# Patient Record
Sex: MALE | Race: OTHER | HISPANIC OR LATINO | URBAN - METROPOLITAN AREA
[De-identification: names, ages, dates, MRNs, and addresses within clinical notes are randomized per-mention and may not be internally consistent; named-entity substitution may affect disease eponyms.]

---

## 2017-10-13 PROBLEM — Z00.00 ENCOUNTER FOR PREVENTIVE HEALTH EXAMINATION: Status: ACTIVE | Noted: 2017-10-13

## 2017-10-18 ENCOUNTER — OUTPATIENT (OUTPATIENT)
Dept: OUTPATIENT SERVICES | Facility: HOSPITAL | Age: 27
LOS: 1 days | End: 2017-10-18
Payer: MEDICAID

## 2017-10-18 VITALS — HEIGHT: 69 IN | WEIGHT: 121.03 LBS

## 2017-10-18 DIAGNOSIS — K05.6 PERIODONTAL DISEASE, UNSPECIFIED: ICD-10-CM

## 2017-10-18 DIAGNOSIS — K02.62 DENTAL CARIES ON SMOOTH SURFACE PENETRATING INTO DENTIN: ICD-10-CM

## 2017-10-18 DIAGNOSIS — K02.52 DENTAL CARIES ON PIT AND FISSURE SURFACE PENETRATING INTO DENTIN: ICD-10-CM

## 2017-10-18 DIAGNOSIS — K08.499 PARTIAL LOSS OF TEETH DUE TO OTHER SPECIFIED CAUSE, UNSPECIFIED CLASS: Chronic | ICD-10-CM

## 2017-10-18 DIAGNOSIS — Z01.818 ENCOUNTER FOR OTHER PREPROCEDURAL EXAMINATION: ICD-10-CM

## 2017-10-18 PROCEDURE — G0463: CPT

## 2017-10-18 NOTE — H&P PST ADULT - NSANTHOSAYNRD_GEN_A_CORE
No. SHAGUFTA screening performed.  STOP BANG Legend: 0-2 = LOW Risk; 3-4 = INTERMEDIATE Risk; 5-8 = HIGH Risk

## 2017-10-18 NOTE — H&P PST ADULT - HISTORY OF PRESENT ILLNESS
27yr old male with autism unwilling to cooperate today.  coming in for in for comprehensive dental work

## 2017-10-25 ENCOUNTER — OUTPATIENT (OUTPATIENT)
Dept: OUTPATIENT SERVICES | Facility: HOSPITAL | Age: 27
LOS: 1 days | End: 2017-10-25
Payer: MEDICAID

## 2017-10-25 VITALS
DIASTOLIC BLOOD PRESSURE: 80 MMHG | HEART RATE: 97 BPM | RESPIRATION RATE: 20 BRPM | TEMPERATURE: 98 F | SYSTOLIC BLOOD PRESSURE: 122 MMHG | OXYGEN SATURATION: 99 %

## 2017-10-25 VITALS — WEIGHT: 121.03 LBS | HEIGHT: 69 IN

## 2017-10-25 DIAGNOSIS — K05.6 PERIODONTAL DISEASE, UNSPECIFIED: ICD-10-CM

## 2017-10-25 DIAGNOSIS — K08.499 PARTIAL LOSS OF TEETH DUE TO OTHER SPECIFIED CAUSE, UNSPECIFIED CLASS: Chronic | ICD-10-CM

## 2017-10-25 DIAGNOSIS — K02.62 DENTAL CARIES ON SMOOTH SURFACE PENETRATING INTO DENTIN: ICD-10-CM

## 2017-10-25 PROCEDURE — D4341: CPT

## 2017-10-25 PROCEDURE — D4210: CPT

## 2017-10-25 RX ORDER — OXYCODONE HYDROCHLORIDE 5 MG/1
5 TABLET ORAL ONCE
Qty: 0 | Refills: 0 | Status: DISCONTINUED | OUTPATIENT
Start: 2017-10-25 | End: 2017-10-25

## 2017-10-25 NOTE — BRIEF OPERATIVE NOTE - PROCEDURE
<<-----Click on this checkbox to enter Procedure Dental exam, with x-ray imaging, dental cleaning, and restoration  10/25/2017    Active  SARAH

## 2017-10-25 NOTE — BRIEF OPERATIVE NOTE - PRE-OP DX
Dental caries extending into dentin  10/25/2017    Active  Valerie Cintron  Periodontal disease  10/25/2017    Active  Valerie Cintron

## 2017-10-26 ENCOUNTER — TRANSCRIPTION ENCOUNTER (OUTPATIENT)
Age: 27
End: 2017-10-26

## 2022-11-02 NOTE — ASU PATIENT PROFILE, ADULT - ALCOHOL USE HISTORY SINGLE SELECT
never Arava Counseling:  Patient counseled regarding adverse effects of Arava including but not limited to nausea, vomiting, abnormalities in liver function tests. Patients may develop mouth sores, rash, diarrhea, and abnormalities in blood counts. The patient understands that monitoring is required including LFTs and blood counts.  There is a rare possibility of scarring of the liver and lung problems that can occur when taking methotrexate. Persistent nausea, loss of appetite, pale stools, dark urine, cough, and shortness of breath should be reported immediately. Patient advised to discontinue Arava treatment and consult with a physician prior to attempting conception. The patient will have to undergo a treatment to eliminate Arava from the body prior to conception.

## 2023-11-13 ENCOUNTER — EMERGENCY (EMERGENCY)
Facility: HOSPITAL | Age: 33
LOS: 1 days | Discharge: HOME | End: 2023-11-13
Attending: EMERGENCY MEDICINE | Admitting: EMERGENCY MEDICINE
Payer: MEDICAID

## 2023-11-13 DIAGNOSIS — K08.499 PARTIAL LOSS OF TEETH DUE TO OTHER SPECIFIED CAUSE, UNSPECIFIED CLASS: Chronic | ICD-10-CM

## 2023-11-13 PROCEDURE — 99283 EMERGENCY DEPT VISIT LOW MDM: CPT

## 2023-11-13 NOTE — ED PROVIDER NOTE - CLINICAL SUMMARY MEDICAL DECISION MAKING FREE TEXT BOX
Nelda: Autism. Cerebral palsy. Fall (as often occurs to him). No e/o significant intracranial injury. No indication for CT or MRI. Dc home w/ return precautions.

## 2023-11-13 NOTE — ED PROVIDER NOTE - PHYSICAL EXAMINATION
Well-appearing, well nourished, awake, alert, oriented to person, place, time/situation and in no apparent distress.    Airway patent. Neck supple.    Eyes without scleral injection. No jaundice.    Strong pulse.    Respirations unlabored.    Abdomen soft, non-tender, no guarding.    MSK: Spine appears normal, range of motion is not limited, no muscle or joint tenderness.    Alert and oriented, no gross motor or sensory deficits.    Skin: normal color for race, warm, dry and intact. No evidence of rash.    No SI/HI. Well-appearing, well nourished, awake, alert, unable to assess orientation 2/2 autism, agitated 2/2 strange situation for him, o/w in no apparent distress.    Airway patent. Neck supple.    Eyes without scleral injection. No jaundice.    Strong pulse.    Respirations unlabored.    Abdomen soft, non-tender, no guarding.    MSK: Spine appears normal, range of motion is not limited, no muscle or joint tenderness.    Alert, no gross motor or sensory deficits. Falling down or lowering himself to the ground occasionally, as per usual.    Skin: normal color for race, warm, dry and intact. No evidence of rash.    No SI/HI.

## 2023-11-13 NOTE — ED PROVIDER NOTE - PATIENT PORTAL LINK FT
You can access the FollowMyHealth Patient Portal offered by [facility] by registering at the following website: [email or web address]. By joining [employer]’s FollowMyHealth portal, you will also be able to view your health information using other applications (apps) compatible with our system.

## 2023-11-13 NOTE — ED ADULT TRIAGE NOTE - CHIEF COMPLAINT QUOTE
Pt brought in by mom c/o fall and hit his head on wooden door today. As per family, Pt occasionally loses balance. No blood thinner use. Pt deaf. Unable to obtain VS in triage, Pt not cooperative. PHx cerebral palsy

## 2023-11-13 NOTE — ED PROVIDER NOTE - OBJECTIVE STATEMENT
Nelda: Cerebral palsy. Falls sometimes. Today, fell at doctor's office. Hit head. No LOC. No lac. Was sent home. Was behaving himself (eating, playing puzzle, getting ready for bedtime). Family got a call from doctor's office to go to ER to see if he needs a CT. No vomiting. No focal weakness. Nelda: Autism. Cerebral palsy. Falls sometimes. Today, fell at doctor's office. Hit head. No LOC. No lac. Was sent home. Was behaving himself (eating, playing puzzle, getting ready for bedtime). Family got a call from doctor's office to go to ER to see if he needs a CT. No vomiting. No focal weakness. Pt agitated when he arrived here, as this is a strange place for him.

## 2023-11-13 NOTE — ED PROVIDER NOTE - NSFOLLOWUPINSTRUCTIONS_ED_ALL_ED_FT
Return if have seizure or vomiting or severe headache.     May be tired than usual owing to possible mild concussion.

## 2023-11-14 PROBLEM — J30.2 OTHER SEASONAL ALLERGIC RHINITIS: Chronic | Status: ACTIVE | Noted: 2017-10-18

## 2023-11-14 PROBLEM — F84.0 AUTISTIC DISORDER: Chronic | Status: ACTIVE | Noted: 2017-10-18

## 2025-02-03 NOTE — ASU DISCHARGE PLAN (ADULT/PEDIATRIC). - NS AS DC DISCHARGE ACCOMPANY
Spinal Block    Date/Time: 2/2/2025 10:43 PM    Performed by: Polly Hsu DO  Authorized by: Polly Hsu DO      General Information and Staff    Start Time:  2/2/2025 10:43 PM  End Time:  2/2/2025 10:47 PM  Anesthesiologist:  Polly Hsu DO  Performed by:  Anesthesiologist  Patient Location:  OR  Site identification: surface landmarks    Reason for Block: surgical anesthesia    Preanesthetic Checklist: patient identified, IV checked, risks and benefits discussed, monitors and equipment checked, pre-op evaluation, timeout performed, anesthesia consent and sterile technique used      Procedure Details    Patient Position:  Sitting  Prep: ChloraPrep and patient draped    Monitoring:  Cardiac monitor and continuous pulse ox  Approach:  Midline  Location:  L3-4  Injection Technique:  Single-shot    Needle    Needle Type:  Pencil-tip  Needle Gauge:  24 G  Needle Length:  4 in    Assessment    Sensory Level:   Events: clear CSF, CSF aspirated, well tolerated and blood negative      Additional Comments       Family

## 2025-06-02 ENCOUNTER — EMERGENCY (EMERGENCY)
Facility: HOSPITAL | Age: 35
LOS: 1 days | End: 2025-06-02
Attending: STUDENT IN AN ORGANIZED HEALTH CARE EDUCATION/TRAINING PROGRAM | Admitting: STUDENT IN AN ORGANIZED HEALTH CARE EDUCATION/TRAINING PROGRAM
Payer: MEDICAID

## 2025-06-02 DIAGNOSIS — K08.499 PARTIAL LOSS OF TEETH DUE TO OTHER SPECIFIED CAUSE, UNSPECIFIED CLASS: Chronic | ICD-10-CM

## 2025-06-02 PROCEDURE — 99284 EMERGENCY DEPT VISIT MOD MDM: CPT

## 2025-06-02 PROCEDURE — 73562 X-RAY EXAM OF KNEE 3: CPT | Mod: 26,LT

## 2025-06-02 NOTE — ED PROVIDER NOTE - NSFOLLOWUPINSTRUCTIONS_ED_ALL_ED_FT
Please follow up with your doctor within 1 week. Bring copies of your results with you (provided in your discharge paperwork).     You may take 500-1000 mg acetaminophen (tylenol) every 6 hours, as needed for pain.  You may take 600 mg ibuprofen every 8 hours, with food, as needed for pain.  You can take tylenol and ibuprofen at the same time.     PLEASE RETURN TO ED FOR NEW OR WORSENING SYMPTOMS (intractable nausea/vomiting, severe bleeding, fever over 100.4F, loss of movement of one or more limbs, seizure, knee redness, inability to walk increased warmth over knee)

## 2025-06-02 NOTE — ED PROVIDER NOTE - ATTENDING CONTRIBUTION TO CARE
I have personally performed a face to face medical and diagnostic evaluation of the patient. I have discussed with and reviewed the Resident's note and agree with the History, ROS, Physical Exam and MDM unless otherwise indicated. A brief summary of my personal evaluation and impression can be found below.    Behzad EVANS:  34-year-old male, history of severe MR emi yates, presents with a chief complaint of concern for left knee injury, patient is unable to provide comprehensive history, per mom and father patient has had episodes of putting himself on the floor with multiple repeated injuries to his knee, they believe he injured it about a week ago at his day group care, noticed swelling to the knee was concerned prompting his visit to the ER, he has been able to walk, no fever is acting at baseline otherwise normally, no reported other injuries or complaints, the episodes of putting himself on the floor are not new per parents.     All other ROS negative, except as above and as per HPI and ROS section.    VITALS: Initial triage and subsequent vitals have been reviewed by me.  GEN APPEARANCE: Alert, non-toxic, well-appearing, NAD.  HEAD: Atraumatic.  EYES: PERRLa, EOMI, vision grossly intact.   NECK: Supple  MSK/EXT: Left knee with diffuse swelling mild warmth able to range fully actively walking in the ER intermittently put himself on the floor  NEURO: Alert,. Sensation and motor normal x4 extremities.   SKIN: Warm, dry and intact. No rash.  PSYCH: Severe MR emi nonverbal    Plan/MDM: Exam unable to obtain vital signs due to severe MR patient is noncompliant intermittently putting himself on the floor, left knee with significant swelling however presentation not appear consistent that of a septic joint as he is able to walk in the ER suspect more likely traumatic effusion/hemarthrosis, no other signs of other injury, it is parents desire to get an xr to eval for fx and to not sedate patient  I explained I have a low suspicion for septic knee at this time though risk versus benefit versus safety of patient in the ED we attempted to expedite imaging for  prompt ray evaluation, patient was walked over to x-ray with multiple providers to facilitate a safe acquisition of images, patient with intermittent episodes of dropping himself on the floor not redirectable, x-ray did not show any signs of obvious fracture, mother and father comfortable taking patient home as he is acting at his normal self otherwise, strict return precautions were discussed with emphasis o on precautions for septic joint, recommended mother attempt to try to use hockey shin pads for further protection.  Mother was comfortable taking home.

## 2025-06-02 NOTE — ED ADULT TRIAGE NOTE - CHIEF COMPLAINT QUOTE
No
Pt ambulatory to triage c/o L knee swelling x 1 week. Brought in by parents. Pt has severe MR, autism. Pt deaf. Unable to obtain VS in triage.

## 2025-06-02 NOTE — ED PROVIDER NOTE - PHYSICAL EXAMINATION
GENERAL: NAD  HEART: Regular rate and rhythm, no murmurs, rubs, or gallops  LUNGS: Unlabored respirations.  Clear to auscultation bilaterally, no crackles, wheezing, or rhonchi  EXTREMITIES: 2+ peripheral pulses bilaterally. No clubbing, cyanosis. edema left knee no erythema not warm to touch  NERVOUS SYSTEM:  A&Ox3, moving all extremities, no focal deficits   SKIN: No rashes or lesions

## 2025-06-02 NOTE — ED PROVIDER NOTE - NSFOLLOWUPCLINICS_GEN_ALL_ED_FT
[facility]  Orthopedic Surgery  [address], [address]rd & [address] [place] [address]  Phone: [phone]  Fax:     [facility]  Orthopedics  [address] [place] [address]  Phone: [phone]  Fax: [phone]

## 2025-06-02 NOTE — ED PROVIDER NOTE - CLINICAL SUMMARY MEDICAL DECISION MAKING FREE TEXT BOX
Patient x-rays negative for fractures edema to begin after multiple traumas on the knee from falling on the knees.  No erythema present no open wounds.  Knee pain and edema likely due to repeated traumas and possible hemarthrosis or hematoma over knee.  Will patient follow-up with orthopedics I will discharge home.  Will recommend barriers for knees such as kneepads.

## 2025-06-02 NOTE — ED PROVIDER NOTE - PATIENT PORTAL LINK FT
You can access the FollowMyHealth Patient Portal offered by [facility] by registering at the following website: [email or web address]. By joining [employer]’s FollowMyHealth portal, you will also be able to view your health information using other applications (apps) compatible with our system.

## 2025-06-02 NOTE — ED PROVIDER NOTE - OBJECTIVE STATEMENT
34-year-old male [facility] severe MR, autism, deaf presenting with knee pain  Patient is a mother states patient is at baseline able to walk for 1 week patient has been having left knee edema.  Patient has been able to walk on the knee.  Patient has been displaying a new pattern of falling on bilateral knees for the last 3 months.  Mother denies fevers chills nausea vomiting or diarrhea gait instability.

## 2025-06-12 ENCOUNTER — APPOINTMENT (OUTPATIENT)
Dept: ORTHOPEDIC SURGERY | Facility: CLINIC | Age: 35
End: 2025-06-12
Payer: MEDICAID

## 2025-06-12 PROCEDURE — 99203 OFFICE O/P NEW LOW 30 MIN: CPT
